# Patient Record
Sex: FEMALE | Race: WHITE | URBAN - METROPOLITAN AREA
[De-identification: names, ages, dates, MRNs, and addresses within clinical notes are randomized per-mention and may not be internally consistent; named-entity substitution may affect disease eponyms.]

---

## 2016-12-12 LAB
CHOLESTEROL, TOTAL: 254 MG/DL
CHOLESTEROL/HDL RATIO: 4.3
HDLC SERPL-MCNC: 59 MG/DL (ref 35–70)
LDL CHOLESTEROL CALCULATED: 174 MG/DL (ref 0–160)
NONHDLC SERPL-MCNC: ABNORMAL MG/DL
TRIGL SERPL-MCNC: 106 MG/DL
VLDLC SERPL CALC-MCNC: ABNORMAL MG/DL

## 2018-03-05 LAB — PAP SMEAR, EXTERNAL: NORMAL

## 2021-04-26 PROBLEM — D68.51 FACTOR V LEIDEN (HCC): Status: ACTIVE | Noted: 2017-01-06

## 2021-04-26 RX ORDER — FLUOXETINE HYDROCHLORIDE 40 MG/1
40 CAPSULE ORAL DAILY
COMMUNITY
Start: 2020-11-18

## 2021-04-26 NOTE — PROGRESS NOTES
Janna Lombard (:  1980) is a 47 y.o. female,New patient, here for evaluation of the following chief complaint(s): New Patient (NP to establish), Other (COVID positive), and Other ([phone])      ASSESSMENT/PLAN:  1. COVID-19  2. Factor V Leiden ([address]  3. Anxiety  4. Pure hypercholesterolemia  Discussed with patient supportive care for Covid 19 illness. Do recommend continued monitoring of her pulse oxygenation. If at rest her pulse ox goes to 92% do recommend further evaluation. If pulse ox goes below 90% then she should proceed to the emergency room. Discussed with patient when she is feeling well she can get the COVID-19 vaccine. With history of factor V Leiden do recommend monitoring for signs of DVT. If this develops she should go to the emergency room immediately. Do recommend regular walks about her home during her recovery. Continue to follow-up with Leyda Katz at the [facility] for her anxiety which is well controlled. Review of chart shows pure hypercholesterolemia. Do recommend monitoring her cholesterol levels at her annual physical.    Return for Annual physical.    SUBJECTIVE/OBJECTIVE:    COVID-19patient was in her normal state of health until she developed a sore throat on . She was tested for Covid on 422 and found to be Covid positive. Patient reports continued coughing, myalgias, fatigue she is using Mucinex and ibuprofen as needed. She does have a pulse oximeter at home and her pulse oxygenation has ranged between 95 and 97%  Factor V Leidenknown historypatient reports she has not had blood clots in the past  Anxietypatient continues fluoxetine 40 mg and feels symptoms are well controlled. She follows with Leyda Katz at the [facility]. Hyperlipidemiareview of labs shows total cholesterol 254  in 2016    Papapproximately 3 years ago    Patient works in chemical sales. She is  and has an 8-year and 6year-old. Patient-Reported Vitals 4/27/2021   Patient-Reported Weight 185lb   Patient-Reported Height 5 7            [INSTRUCTIONS:  \"[x]\" Indicates a positive item  \"[]\" Indicates a negative item  -- DELETE ALL ITEMS NOT EXAMINED]    Constitutional: [x] Appears well-developed and well-nourished [x] No apparent distress      [] Abnormal -     Mental status: [x] Alert and awake  [x] Oriented to person/place/time [x] Able to follow commands    [] Abnormal -     Eyes:   EOM    [x]  Normal    [] Abnormal -   Sclera  [x]  Normal    [] Abnormal -          Discharge [x]  None visible   [] Abnormal -     HENT: [x] Normocephalic, atraumatic  [] Abnormal -   [x] Mouth/Throat: Mucous membranes are moist    External Ears [x] Normal  [] Abnormal -    Neck: [x] No visualized mass [] Abnormal -     Pulmonary/Chest: [x] Respiratory effort normal   [x] No visualized signs of difficulty breathing or respiratory distress        [] Abnormal -      Musculoskeletal:   [] Normal gait with no signs of ataxia         [] Normal range of motion of neck        [] Abnormal -     Neurological:        [x] No Facial Asymmetry (Cranial nerve 7 motor function) (limited exam due to video visit)          [x] No gaze palsy        [] Abnormal -          Skin:        [x] No significant exanthematous lesions or discoloration noted on facial skin         [] Abnormal -            Psychiatric:       [x] Normal Affect [] Abnormal -        [x] No Hallucinations    Other pertinent observable physical exam findings:-              Luis A Gaitan, was evaluated through a synchronous (real-time) audio-video encounter. The patient (or guardian if applicable) is aware that this is a billable service. Verbal consent to proceed has been obtained within the past 12 months.  The visit was conducted pursuant to the emergency declaration under the [address] [facility] waiver authority and the [employer] and [employer] Appropriations Act. Patient identification was verified, and a caregiver was present when appropriate. The patient was located in a state where the provider was credentialed to provide care. An electronic signature was used to authenticate this note.     --Collin Wen MD

## 2021-04-27 ENCOUNTER — VIRTUAL VISIT (OUTPATIENT)
Dept: PRIMARY CARE CLINIC | Age: 41
End: 2021-04-27
Payer: COMMERCIAL

## 2021-04-27 DIAGNOSIS — F41.9 ANXIETY: ICD-10-CM

## 2021-04-27 DIAGNOSIS — D68.51 FACTOR V LEIDEN (HCC): ICD-10-CM

## 2021-04-27 DIAGNOSIS — E78.00 PURE HYPERCHOLESTEROLEMIA: ICD-10-CM

## 2021-04-27 DIAGNOSIS — U07.1 COVID-19: Primary | ICD-10-CM

## 2021-04-27 PROCEDURE — 99203 OFFICE O/P NEW LOW 30 MIN: CPT | Performed by: FAMILY MEDICINE

## 2021-04-27 ASSESSMENT — PATIENT HEALTH QUESTIONNAIRE - PHQ9
1. LITTLE INTEREST OR PLEASURE IN DOING THINGS: 0
SUM OF ALL RESPONSES TO PHQ9 QUESTIONS 1 & 2: 0
SUM OF ALL RESPONSES TO PHQ QUESTIONS 1-9: 0
2. FEELING DOWN, DEPRESSED OR HOPELESS: 0
SUM OF ALL RESPONSES TO PHQ QUESTIONS 1-9: 0

## 2021-04-27 NOTE — PATIENT INSTRUCTIONS
medical condition or this instruction, always ask your healthcare professional. [place] [address] any warranty or liability for your use of this information. Patient Education        Learning How To Care for Someone Who Has COVID-19  Things to know  Most people who get COVID-19 will recover with time and home care. Here are some things to know if you're caring for someone who's sick. · Treat the symptoms. Common symptoms include a fever, coughing, and feeling short of breath. Urge the person to get extra rest and drink plenty of fluids to replace fluids lost from fever. To reduce a fever, offer acetaminophen (such as Tylenol). It may also help with muscle aches. Read and follow all instructions on the label. · Watch for signs that the illness is getting worse. The person may need medical care if they're getting sicker (for example, if it's hard to breathe). But call the doctor's office before you go. They can tell you what to do. Call 911 or emergency services if the person has any of these symptoms:  ? Severe trouble breathing or shortness of breath. ? Constant pain or pressure in their chest.  ? Confusion, or trouble thinking clearly. ? A blue tint to their lips or face. Some people are more likely to get very sick and need medical care. Call the doctor as soon as symptoms start if the person you're caring for is over 72, smokes, or has a serious health problem, like asthma, heart disease, diabetes, or an immune system problem. · Protect yourself and others. The virus spreads easily from person to person, so take extra care to avoid catching or spreading the infection. ? Keep the sick person away from others as much as you can. § Have the person stay in one room. If you can, give them their own bathroom to use. § Have only one person take care of them. Keep other peopleand petsout of the sickroom. § Have the person wear a cloth face cover around other people.  This includes when anyone is in the room with them or if they leave their room (for example, to go to the bathroom). If the face cover makes it harder for the sick person to breathe, the other person should wear a face cover. § Don't share personal items. These include dishes, cups, towels, and bedding. ? Wash your hands often and well. Use soap and water, and scrub for at least 20 seconds. This is especially important after you've been around the sick person or touched things they've touched. If soap and water aren't handy, use a hand  with at least 60% alcohol. ? Avoid touching your mouth, nose, and eyes. ? Take care with the person's laundry. It's okay to wash the sick person's laundry with yours. If you have them, wear disposable gloves when handling their dirty laundry, and wash your hands well after you touch it. Wash items in the warmest water allowed for the fabric type, and dry them completely. ? Clean high-touch items every day and anytime the sick person touches them. These include doorknobs, light switches, toilets, counters, and remote controls. Use a household disinfectant or a homemade bleach solution. (Follow the directions on the label.) If the sick person has their own room, have them disinfect it every day. ? Limit visitors to your home. To help protect family and friends, stay in touch with them only by phone or computer. Where can you learn more? Go to https://[employer] [email or web address].[employer]IQ. org and sign in to your OurHouse account. Enter N409 in the Draftster box to learn more about \"Learning How To Care for Someone Who Has COVID-19. \"     If you do not have an account, please click on the \"Sign Up Now\" link. Current as of: February 19, 2021               Content Version: 12.8  © 6837-3947 Healthwise, Incorporated. Care instructions adapted under license by [facility] ([facility]).  If you have questions about a medical condition or this instruction, always ask your healthcare professional. [address] any warranty or liability for your use of this information.

## 2023-02-24 ENCOUNTER — OFFICE VISIT (OUTPATIENT)
Dept: PRIMARY CARE CLINIC | Age: 43
End: 2023-02-24
Payer: COMMERCIAL

## 2023-02-24 VITALS
HEIGHT: 67 IN | TEMPERATURE: 97.4 F | OXYGEN SATURATION: 98 % | RESPIRATION RATE: 18 BRPM | SYSTOLIC BLOOD PRESSURE: 116 MMHG | HEART RATE: 73 BPM | WEIGHT: 212.2 LBS | DIASTOLIC BLOOD PRESSURE: 82 MMHG | BODY MASS INDEX: 33.3 KG/M2

## 2023-02-24 DIAGNOSIS — R06.83 SNORING: ICD-10-CM

## 2023-02-24 DIAGNOSIS — F41.9 ANXIETY: ICD-10-CM

## 2023-02-24 DIAGNOSIS — Z12.31 ENCOUNTER FOR SCREENING MAMMOGRAM FOR BREAST CANCER: ICD-10-CM

## 2023-02-24 DIAGNOSIS — K21.9 GASTROESOPHAGEAL REFLUX DISEASE WITHOUT ESOPHAGITIS: ICD-10-CM

## 2023-02-24 DIAGNOSIS — Z00.00 EXAMINATION, MEDICAL, GENERAL: Primary | ICD-10-CM

## 2023-02-24 PROCEDURE — 99396 PREV VISIT EST AGE 40-64: CPT | Performed by: FAMILY MEDICINE

## 2023-02-24 RX ORDER — OMEPRAZOLE 20 MG/1
20 CAPSULE, DELAYED RELEASE ORAL DAILY
COMMUNITY
End: 2023-02-24 | Stop reason: SDUPTHER

## 2023-02-24 RX ORDER — FLUOXETINE HYDROCHLORIDE 40 MG/1
40 CAPSULE ORAL DAILY
Qty: 90 CAPSULE | Refills: 1 | Status: SHIPPED | OUTPATIENT
Start: 2023-02-24

## 2023-02-24 RX ORDER — OMEPRAZOLE 20 MG/1
20 CAPSULE, DELAYED RELEASE ORAL DAILY
Qty: 90 CAPSULE | Refills: 1 | Status: SHIPPED | OUTPATIENT
Start: 2023-02-24

## 2023-02-24 SDOH — ECONOMIC STABILITY: FOOD INSECURITY: WITHIN THE PAST 12 MONTHS, YOU WORRIED THAT YOUR FOOD WOULD RUN OUT BEFORE YOU GOT MONEY TO BUY MORE.: NEVER TRUE

## 2023-02-24 SDOH — ECONOMIC STABILITY: FOOD INSECURITY: WITHIN THE PAST 12 MONTHS, THE FOOD YOU BOUGHT JUST DIDN'T LAST AND YOU DIDN'T HAVE MONEY TO GET MORE.: NEVER TRUE

## 2023-02-24 SDOH — ECONOMIC STABILITY: INCOME INSECURITY: HOW HARD IS IT FOR YOU TO PAY FOR THE VERY BASICS LIKE FOOD, HOUSING, MEDICAL CARE, AND HEATING?: NOT HARD AT ALL

## 2023-02-24 SDOH — ECONOMIC STABILITY: HOUSING INSECURITY
IN THE LAST 12 MONTHS, WAS THERE A TIME WHEN YOU DID NOT HAVE A STEADY PLACE TO SLEEP OR SLEPT IN A SHELTER (INCLUDING NOW)?: NO

## 2023-02-24 ASSESSMENT — PATIENT HEALTH QUESTIONNAIRE - PHQ9
SUM OF ALL RESPONSES TO PHQ QUESTIONS 1-9: 0
1. LITTLE INTEREST OR PLEASURE IN DOING THINGS: 0
SUM OF ALL RESPONSES TO PHQ9 QUESTIONS 1 & 2: 0
2. FEELING DOWN, DEPRESSED OR HOPELESS: 0

## 2023-02-24 NOTE — PROGRESS NOTES
2/24/2023    Antonio hTomas is a 38 y.o. female, here for a preventive medicine evaluation. Sleep-  no difficulty falling or staying asleep,  + nightly snoring history  Falls asleep quickly, + sleepiness later afternoon    Exercise- enjoys walking, golf,   Nutrition- eats well, variety, plenty fruits and veg    Gerd- on omperazole mostly controlled, tums with flares from triggers      Anxiety-has experienced anxiety symptoms throughout her life.   She does believe she had a postpartum depression episode as well  No current depressive symptoms but feels overall fluoxetine improves her anxiety symptoms  Sleep is improved  No panic  She does note her menstrual cycle can increase symptoms  No side effects of her medication    Mammogram-no previous she has had breast augmentation    Gyn- dr Raine Jeff pap 2018, has appointment   Colon cancer screening-began at age 41  Unsure date of last Tdap    Can note a decrease in hearing ability-unsure if situational or physical does note difficulty in a crowd does not tend to increase the sound of music or TV questions if it is more attention/interest  Allergies   Allergen Reactions    Sulfa Antibiotics Shortness Of Breath     Past Medical History:   Diagnosis Date    Anxiety 4/27/2021    Bipolar 1 disorder ([facility] [address]     Factor V Leiden ([facility] [address]     Gastroesophageal reflux disease without esophagitis 2/24/2023    Nicotine dependence, cigarettes, in remission     Quit 2008    Pure hypercholesterolemia 4/27/2021     Past Surgical History:   Procedure Laterality Date    BREAST ENHANCEMENT SURGERY      LAPAROSCOPY      to remove IUD    OTHER SURGICAL HISTORY  02/26/2016    ROBOTIC ASSISTED REMOVAL INFARCTED CYST    WISDOM TOOTH EXTRACTION       Family History   Problem Relation Age of Onset    Atrial Fibrillation Mother     Arthritis Father         Rheumatoid arthritis    Clotting Disorder Sister                Vitals:    02/24/23 0929   BP: 116/82   Site: Left Upper Arm   Position: Sitting Cuff Size: Large Adult   Pulse: 73   Resp: 18   Temp: 97.4 °F (36.3 °C)   TempSrc: Temporal   SpO2: 98%   Weight: 212 lb 3.2 oz (96.3 kg)   Height: 5' 7\" (1.702 m)     Estimated body mass index is 33.24 kg/m² as calculated from the following:    Height as of this encounter: 5' 7\" (1.702 m). Weight as of this encounter: 212 lb 3.2 oz (96.3 kg). Physical Exam  Vitals reviewed. Constitutional:       Appearance: Normal appearance. HENT:      Head: Normocephalic and atraumatic. Right Ear: Tympanic membrane, ear canal and external ear normal.      Left Ear: Tympanic membrane, ear canal and external ear normal.   Eyes:      General: No scleral icterus. Extraocular Movements: Extraocular movements intact. Conjunctiva/sclera: Conjunctivae normal.      Pupils: Pupils are equal, round, and reactive to light. Neck:      Thyroid: No thyroid mass, thyromegaly or thyroid tenderness. Cardiovascular:      Rate and Rhythm: Normal rate and regular rhythm. Heart sounds: Normal heart sounds. Pulmonary:      Effort: Pulmonary effort is normal.      Breath sounds: Normal breath sounds. No wheezing, rhonchi or rales. Abdominal:      Palpations: Abdomen is soft. Tenderness: There is no abdominal tenderness. There is no guarding or rebound. Musculoskeletal:      Cervical back: Neck supple. No rigidity. No muscular tenderness. Right lower leg: No edema. Left lower leg: No edema. Lymphadenopathy:      Cervical: No cervical adenopathy. Skin:     General: Skin is warm and dry. Findings: No rash. Neurological:      General: No focal deficit present. Mental Status: She is alert and oriented to person, place, and time. Cranial Nerves: No cranial nerve deficit. Psychiatric:         Mood and Affect: Mood normal.         Behavior: Behavior normal.         Thought Content:  Thought content normal.         Judgment: Judgment normal.       Immunization History   Administered Date(s) Administered    COVID-19, PFIZER PURPLE top, DILUTE for use, (age 15 y+), 30mcg/0.3mL 05/14/2021    DTaP (Infanrix) 1982, 01/28/1983, 03/12/1983, 05/23/1984, 10/06/1987    HPV Quadrivalent (Gardasil) 05/31/1985    Hepatitis B 01/25/1993, 02/22/1993, 07/27/1993    Influenza Virus Vaccine 10/14/2014, 11/21/2016    Influenza, FLUARIX, FLULAVAL, Kristen Fournier (age 10 mo+) AND AFLURIA, (age 1 y+), PF, 0.5mL 11/03/2016    MMR 02/24/1984, 12/18/1989    Polio IPV (IPOL) 01/28/1983, 04/02/1983, 05/31/1983, 05/23/1984, 11/30/1988    Td (Adult), 5 Lf Tetanus Toxoid, Pf (Tenivac, Decavac) 09/01/2007    Tdap (Boostrix, Adacel) 09/27/2012    Varicella (Varivax) 06/05/1996, 07/19/1996     Health Maintenance   Topic Date Due    Hepatitis C screen  Never done    Diabetes screen  Never done    COVID-19 Vaccine (2 - Pfizer series) 06/04/2021    Flu vaccine (1) 08/01/2022    DTaP/Tdap/Td vaccine (6 - Td or Tdap) 09/27/2022    Lipids  11/27/2022    Cervical cancer screen  03/09/2023 (Originally 3/9/2021)    Depression Screen  02/24/2024    Varicella vaccine  Completed    HIV screen  Completed    Hepatitis A vaccine  Aged Out    Hib vaccine  Aged Out    Meningococcal (ACWY) vaccine  Aged Out    Pneumococcal 0-64 years Vaccine  Aged Out       ASSESSMENT:  1. Examination, medical, general    2. Gastroesophageal reflux disease without esophagitis    3. Anxiety    4. Snoring    5. BMI 33.0-33.9,adult    6. Encounter for screening mammogram for breast cancer         PLAN:  1. Examination, medical, general  Counseled on preventative care and healthy lifestyle measures   - Comprehensive Metabolic Panel; Future  - CBC with Auto Differential; Future  - Lipid Panel; Future  - Hemoglobin A1C; Future  - TSH with Reflex; Future  - Hepatitis C Antibody; Future  - Magnesium; Future  - Vitamin B12 & Folate; Future    2.  Gastroesophageal reflux disease without esophagitis  Determine and eliminate any food or beverage triggers; limit size of meals, limit eating within 3 hours of bed, elevate head of bed; weight loss. - omeprazole (PRILOSEC) 20 MG delayed release capsule; Take 1 capsule by mouth daily  Dispense: 90 capsule; Refill: 1    3. Anxiety  Encourage stress relieving activities; recommend 150 minutes cardiovascular exercise each week. Recommend activities such as yoga or stretching, journal prior to bedtime. Consider counseling.    - FLUoxetine (PROZAC) 40 MG capsule; Take 1 capsule by mouth daily  Dispense: 90 capsule; Refill: 1    4. Snoring    - Hernando Yeh MD, [facility]    5. BMI 33.0-33.9,adult  Work towards 150 min cardio per week    6. Encounter for screening mammogram for breast cancer    - [facility] KRISTINA DIGITAL SCREEN BILATERAL; Future       Check date of last tdap  Return for nurse visit for lab work. An electronic signature was used to authenticate this note.     --Celestine Dakin, MD on 2/24/2023 at 10:35 AM

## 2023-02-27 ENCOUNTER — NURSE ONLY (OUTPATIENT)
Dept: PRIMARY CARE CLINIC | Age: 43
End: 2023-02-27

## 2023-02-27 DIAGNOSIS — Z00.00 EXAMINATION, MEDICAL, GENERAL: ICD-10-CM

## 2023-02-27 LAB
A/G RATIO: 1.9 (ref 1.1–2.2)
ALBUMIN SERPL-MCNC: 4.3 G/DL (ref 3.4–5)
ALP BLD-CCNC: 107 U/L (ref 40–129)
ALT SERPL-CCNC: 28 U/L (ref 10–40)
ANION GAP SERPL CALCULATED.3IONS-SCNC: 13 MMOL/L (ref 3–16)
AST SERPL-CCNC: 19 U/L (ref 15–37)
BASOPHILS ABSOLUTE: 0.1 K/UL (ref 0–0.2)
BASOPHILS RELATIVE PERCENT: 1.1 %
BILIRUB SERPL-MCNC: <0.2 MG/DL (ref 0–1)
BUN BLDV-MCNC: 11 MG/DL (ref 7–20)
CALCIUM SERPL-MCNC: 9.5 MG/DL (ref 8.3–10.6)
CHLORIDE BLD-SCNC: 105 MMOL/L (ref 99–110)
CHOLESTEROL, TOTAL: 342 MG/DL (ref 0–199)
CO2: 24 MMOL/L (ref 21–32)
CREAT SERPL-MCNC: 0.9 MG/DL (ref 0.6–1.1)
EOSINOPHILS ABSOLUTE: 0.2 K/UL (ref 0–0.6)
EOSINOPHILS RELATIVE PERCENT: 3.7 %
FOLATE: 7.24 NG/ML (ref 4.78–24.2)
GFR SERPL CREATININE-BSD FRML MDRD: >60 ML/MIN/{1.73_M2}
GLUCOSE BLD-MCNC: 95 MG/DL (ref 70–99)
HCT VFR BLD CALC: 39.7 % (ref 36–48)
HDLC SERPL-MCNC: 43 MG/DL (ref 40–60)
HEMOGLOBIN: 13.2 G/DL (ref 12–16)
HEPATITIS C ANTIBODY INTERPRETATION: NORMAL
LDL CHOLESTEROL CALCULATED: 242 MG/DL
LYMPHOCYTES ABSOLUTE: 1.9 K/UL (ref 1–5.1)
LYMPHOCYTES RELATIVE PERCENT: 33 %
MAGNESIUM: 2.3 MG/DL (ref 1.8–2.4)
MCH RBC QN AUTO: 31.4 PG (ref 26–34)
MCHC RBC AUTO-ENTMCNC: 33.2 G/DL (ref 31–36)
MCV RBC AUTO: 94.6 FL (ref 80–100)
MONOCYTES ABSOLUTE: 0.4 K/UL (ref 0–1.3)
MONOCYTES RELATIVE PERCENT: 6.4 %
NEUTROPHILS ABSOLUTE: 3.2 K/UL (ref 1.7–7.7)
NEUTROPHILS RELATIVE PERCENT: 55.8 %
PDW BLD-RTO: 14.5 % (ref 12.4–15.4)
PLATELET # BLD: 324 K/UL (ref 135–450)
PMV BLD AUTO: 8.5 FL (ref 5–10.5)
POTASSIUM SERPL-SCNC: 5.4 MMOL/L (ref 3.5–5.1)
RBC # BLD: 4.2 M/UL (ref 4–5.2)
SODIUM BLD-SCNC: 142 MMOL/L (ref 136–145)
TOTAL PROTEIN: 6.6 G/DL (ref 6.4–8.2)
TRIGL SERPL-MCNC: 286 MG/DL (ref 0–150)
TSH REFLEX: 2.91 UIU/ML (ref 0.27–4.2)
VITAMIN B-12: 497 PG/ML (ref 211–911)
VLDLC SERPL CALC-MCNC: 57 MG/DL
WBC # BLD: 5.7 K/UL (ref 4–11)

## 2023-02-28 LAB
ESTIMATED AVERAGE GLUCOSE: 108.3 MG/DL
HBA1C MFR BLD: 5.4 %

## 2024-02-08 LAB — PAP SMEAR, EXTERNAL: NORMAL

## 2024-03-21 DIAGNOSIS — F41.9 ANXIETY: ICD-10-CM

## 2024-03-21 RX ORDER — FLUOXETINE HYDROCHLORIDE 40 MG/1
40 CAPSULE ORAL DAILY
Qty: 30 CAPSULE | Refills: 0 | OUTPATIENT
Start: 2024-03-21

## 2024-03-21 NOTE — TELEPHONE ENCOUNTER
[phone] (home)   [facility] for call back to schedule appointment    Medication:   Requested Prescriptions     Pending Prescriptions Disp Refills    FLUoxetine (PROZAC) 40 MG capsule [Pharmacy Med Name: FLUOXETINE 40MG CAPSULES] 90 capsule 1     Sig: TAKE 1 CAPSULE BY MOUTH DAILY        Last Filled:  02.24.2023 #90 w/ 1 refill     Patient Phone Number: [phone] (home)     Last appt: 2/24/2023   Next appt: Visit date not found    Last OARRS:        No data to display

## 2025-07-01 ASSESSMENT — PATIENT HEALTH QUESTIONNAIRE - PHQ9
2. FEELING DOWN, DEPRESSED OR HOPELESS: NOT AT ALL
SUM OF ALL RESPONSES TO PHQ QUESTIONS 1-9: 0
1. LITTLE INTEREST OR PLEASURE IN DOING THINGS: NOT AT ALL

## 2025-07-02 ASSESSMENT — PATIENT HEALTH QUESTIONNAIRE - PHQ9
SUM OF ALL RESPONSES TO PHQ9 QUESTIONS 1 & 2: 0
1. LITTLE INTEREST OR PLEASURE IN DOING THINGS: NOT AT ALL
2. FEELING DOWN, DEPRESSED OR HOPELESS: NOT AT ALL

## 2025-07-07 ENCOUNTER — OFFICE VISIT (OUTPATIENT)
Dept: PRIMARY CARE CLINIC | Age: 45
End: 2025-07-07
Payer: COMMERCIAL

## 2025-07-07 VITALS
BODY MASS INDEX: 31.31 KG/M2 | RESPIRATION RATE: 16 BRPM | TEMPERATURE: 97.4 F | SYSTOLIC BLOOD PRESSURE: 124 MMHG | HEIGHT: 66 IN | DIASTOLIC BLOOD PRESSURE: 86 MMHG | OXYGEN SATURATION: 98 % | WEIGHT: 194.8 LBS | HEART RATE: 80 BPM

## 2025-07-07 DIAGNOSIS — N63.42 SUBAREOLAR MASS OF LEFT BREAST: ICD-10-CM

## 2025-07-07 DIAGNOSIS — K21.9 GASTROESOPHAGEAL REFLUX DISEASE WITHOUT ESOPHAGITIS: ICD-10-CM

## 2025-07-07 DIAGNOSIS — Z23 NEED FOR VACCINATION: ICD-10-CM

## 2025-07-07 DIAGNOSIS — Z00.00 EXAMINATION, MEDICAL, GENERAL: Primary | ICD-10-CM

## 2025-07-07 DIAGNOSIS — E78.00 PURE HYPERCHOLESTEROLEMIA: ICD-10-CM

## 2025-07-07 DIAGNOSIS — Z98.890 HISTORY OF AUGMENTATION OF BOTH BREASTS: ICD-10-CM

## 2025-07-07 PROBLEM — R06.83 SNORING: Status: RESOLVED | Noted: 2023-02-24 | Resolved: 2025-07-07

## 2025-07-07 PROCEDURE — 99396 PREV VISIT EST AGE 40-64: CPT | Performed by: FAMILY MEDICINE

## 2025-07-07 SDOH — ECONOMIC STABILITY: FOOD INSECURITY: WITHIN THE PAST 12 MONTHS, YOU WORRIED THAT YOUR FOOD WOULD RUN OUT BEFORE YOU GOT MONEY TO BUY MORE.: NEVER TRUE

## 2025-07-07 SDOH — ECONOMIC STABILITY: FOOD INSECURITY: WITHIN THE PAST 12 MONTHS, THE FOOD YOU BOUGHT JUST DIDN'T LAST AND YOU DIDN'T HAVE MONEY TO GET MORE.: NEVER TRUE

## 2025-07-07 NOTE — PROGRESS NOTES
7/7/2025    Keyana Mcgowan is a 44 y.o. female, here for a preventive medicine evaluation.    History of Present Illness  The patient presents for evaluation of a breast lump and overall health maintenance.    She discovered a lump in her breast near the areola approximately one month ago, towards the end of her menstrual cycle. While the area has always been somewhat lumpy, this lump feels distinct and hard. It is not painful, but she experiences occasional itching in the nipple and areola area without visible dry skin or other abnormalities. There is no discharge from the nipple or any rashes. She consulted her OB-GYN last fall, who recommended a preventative mammogram. Due to her busy schedule, she has not yet undergone this procedure    She started tirzepatide 2 to 3 weeks ago to lose 20 pounds and has responded well, experiencing no side effects such as nausea or fatigue. Her appetite has slightly decreased, and she remains active. She reports that her snoring has improved since losing weight, except when consuming wine.    She is taking fluoxetine 60 mg daily, prescribed by Concetta Kiser, which effectively manages her anxiety symptoms.     There are no new allergies or changes in family history.     She reports no changes in vision or hearing, although she notes a slight decrease in hearing in very loud environments.    She takes omeprazole 20 mg every morning, which effectively controls her reflux symptoms. Her diet is healthy, consisting mainly of lean protein and vegetables, and she avoids fried foods.    She is due for a tetanus vaccine.    PAST SURGICAL HISTORY:  Breast implants    FAMILY HISTORY  Her grandfather had a heart attack.         Health Maintenance   Topic Date Due    DTaP/Tdap/Td vaccine (6 - Td or Tdap) 09/27/2022    Breast cancer screen  Never done    COVID-19 Vaccine (2 - 2024-25 season) 09/01/2024    Flu vaccine (1) 08/01/2025    Depression Screen  07/01/2026    Lipids  02/27/2028